# Patient Record
Sex: FEMALE | Race: WHITE | NOT HISPANIC OR LATINO | Employment: PART TIME | URBAN - NONMETROPOLITAN AREA
[De-identification: names, ages, dates, MRNs, and addresses within clinical notes are randomized per-mention and may not be internally consistent; named-entity substitution may affect disease eponyms.]

---

## 2017-02-24 ENCOUNTER — OFFICE VISIT (OUTPATIENT)
Dept: CARDIOLOGY | Facility: CLINIC | Age: 22
End: 2017-02-24

## 2017-02-24 VITALS
WEIGHT: 136 LBS | HEIGHT: 66 IN | DIASTOLIC BLOOD PRESSURE: 65 MMHG | SYSTOLIC BLOOD PRESSURE: 115 MMHG | BODY MASS INDEX: 21.86 KG/M2 | HEART RATE: 87 BPM

## 2017-02-24 DIAGNOSIS — R00.0 TACHYCARDIA: ICD-10-CM

## 2017-02-24 DIAGNOSIS — I42.9 CARDIOMYOPATHY (HCC): Primary | ICD-10-CM

## 2017-02-24 PROCEDURE — 99213 OFFICE O/P EST LOW 20 MIN: CPT | Performed by: INTERNAL MEDICINE

## 2017-02-24 RX ORDER — METOPROLOL SUCCINATE 25 MG/1
25 TABLET, EXTENDED RELEASE ORAL DAILY
Qty: 30 TABLET | Refills: 6 | Status: SHIPPED | OUTPATIENT
Start: 2017-02-24 | End: 2018-04-27

## 2017-02-24 RX ORDER — METOPROLOL SUCCINATE 25 MG/1
25 TABLET, EXTENDED RELEASE ORAL DAILY
COMMUNITY
End: 2017-02-24 | Stop reason: SDUPTHER

## 2017-02-24 RX ORDER — FEXOFENADINE HCL 180 MG/1
180 TABLET ORAL
COMMUNITY

## 2017-02-24 NOTE — PROGRESS NOTES
"Madiha Singletary  22 y.o. female    02/24/2017  1. Cardiomyopathy    2. Tachycardia        History of Present Illness    Ms. Singletary is here for follow-up of above stated problems.  She reports that her palpitations are better with low-dose beta blocker but has had mild weight gain.  Extreme to her that it is highly unlikely that it's related to the medication.  Clinical exam was unremarkable.  She denied any palpitations or dyspnea.  No chest pain was reported.        SUBJECTIVE    Allergies   Allergen Reactions   • Codeine    • Iodinated Diagnostic Agents    • Meropenem    • Oxycodone    • Tegaderm Ag Mesh [Silver]    • Vancomycin    • Zithromax [Azithromycin]          Past Medical History   Diagnosis Date   • Leukemia          Past Surgical History   Procedure Laterality Date   • Lung surgery     • Mediport insertion, single           History reviewed. No pertinent family history.      Social History     Social History   • Marital status: Single     Spouse name: N/A   • Number of children: N/A   • Years of education: N/A     Occupational History   • Not on file.     Social History Main Topics   • Smoking status: Never Smoker   • Smokeless tobacco: Never Used   • Alcohol use Yes      Comment: occasionally   • Drug use: No   • Sexual activity: Defer     Other Topics Concern   • Not on file     Social History Narrative         Current Outpatient Prescriptions   Medication Sig Dispense Refill   • AMITIZA 24 MCG capsule      • fexofenadine (ALLEGRA) 180 MG tablet Take 180 mg by mouth.     • metoprolol succinate XL (TOPROL-XL) 25 MG 24 hr tablet Take 25 mg by mouth Daily.     • montelukast (SINGULAIR) 10 MG tablet Take 10 mg by mouth every night.     • norethindrone-ethinyl estradiol (ORTHO-NOVUM 1/35, 28,) 1-35 MG-MCG per tablet Take 1 tablet by mouth.       No current facility-administered medications for this visit.          OBJECTIVE    Visit Vitals   • /65   • Pulse 87   • Ht 66\" (167.6 cm)   • Wt 136 lb " (61.7 kg)   • BMI 21.95 kg/m2           Review of Systems     Constitutional:  Denies recent weight loss, weight gain, fever or chills, no change in exercise tolerance     HENT:  Denies any hearing loss, epistaxis, hoarseness, or difficulty speaking.     Eyes: Wears eyeglasses or contact lenses     Respiratory:  Denies dyspnea with exertion,no cough, wheezing, or hemoptysis.     Cardiovascular: Negative for palpations, chest pain, orthopnea, PND, peripheral edema, syncope, or claudication.     Gastrointestinal:  Denies change in bowel habits, dyspepsia, ulcer disease, hematochezia, or melena.     Endocrine: Negative for cold intolerance, heat intolerance, polydipsia, polyphagia and polyuria. Denies any history of weight change, heat/cold intolerance, polydipsia, polyuria     Genitourinary: Negative.      Musculoskeletal: Denies any history of arthritic symptoms or back problems     Skin:  Denies any change in hair or nails, rashes, or skin lesions.     Allergic/Immunologic: Negative.  Negative for environmental allergies, food allergies and immunocompromised state.     Neurological:  Denies any history of recurrent headaches, strokes, TIA, or seizure disorder.     Hematological: Denies any food allergies, seasonal allergies, bleeding disorders, or lymphadenopathy.     Psychiatric/Behavioral: Denies any history of depression, substance abuse, or change in cognitive function.         Physical Exam     Constitutional: Cooperative, alert and oriented, well-developed, well-nourished, in no acute distress.     HENT:   Head: Normocephalic, normal hair patterns, no masses or tenderness.  Ears, Nose, and Throat: No gross abnormalities. No pallor or cyanosis. Dentition good.   Eyes: EOMS intact, PERRL, conjunctivae and lids unremarkable. Fundoscopic exam and visual fields not performed.   Neck: No palpable masses or adenopathy, no thyromegaly, no JVD, carotid pulses are full and equal bilaterally and without  Bruits.      Cardiovascular: Regular rhythm, S1 and S2 normal, no S3 or S4. Apical impulse not displaced. No murmurs, gallops, or rubs detected.     Pulmonary/Chest: Chest: normal symmetry, no tenderness to palpation, normal respiratory excursion, no intercostal retraction, no use of accessory muscles.            Pulmonary: Normal breath sounds. No rales or ronchi.    Abdominal: Abdomen soft, bowel sounds normoactive, no masses, no hepatosplenomegaly, non-tender, no bruits.     Musculoskeletal: No deformities, clubbing, cyanosis, erythema, or edema observed. There are no spinal abnormalities noted. Normal muscle strength and tone. Pulses full and equal in all extremities, no bruits auscultated.     Neurological: No gross motor or sensory deficits noted, affect appropriate, oriented to time, person, place.     Skin: Warm and dry to the touch, no apparent skin lesions or masses noted.     Psychiatric: She has a normal mood and affect. Her behavior is normal. Judgment and thought content normal.         Procedures      No results found for: WBC, HGB, HCT, MCV, PLT  No results found for: GLUCOSE, BUN, CREATININE, EGFRIFNONA, EGFRIFAFRI, BCR, CO2, CALCIUM, PROTENTOTREF, ALBUMIN, LABIL2, AST, ALT  No results found for: CHOL  No results found for: TRIG  No results found for: HDL  No results found for: LDLCALC  No results found for: LDL  No results found for: HDLLDLRATIO  No components found for: CHOLHDL  No results found for: HGBA1C  No results found for: TSH, C8ABJAG, T2GBHHA, THYROIDAB        ASSESSMENT AND PLAN  Ms. Singletary is clinically stable with no evidence of angina, arrhythmia or congestive heart failure.  An echocardiogram to reassess left ventricular and valvular function will be arranged.    Diagnoses and all orders for this visit:    Cardiomyopathy    Tachycardia        Nathaniel Mukherjee MD  2/24/2017  2:07 PM

## 2017-03-08 ENCOUNTER — DOCUMENTATION (OUTPATIENT)
Dept: CARDIOLOGY | Facility: CLINIC | Age: 22
End: 2017-03-08

## 2018-03-09 ENCOUNTER — TRANSCRIBE ORDERS (OUTPATIENT)
Dept: CARDIOLOGY | Facility: CLINIC | Age: 23
End: 2018-03-09

## 2018-03-09 DIAGNOSIS — D69.6 THROMBOCYTOPENIA (HCC): ICD-10-CM

## 2018-03-09 DIAGNOSIS — Z85.6 HISTORY OF MYELOID LEUKEMIA: Primary | ICD-10-CM

## 2018-04-27 ENCOUNTER — OFFICE VISIT (OUTPATIENT)
Dept: CARDIOLOGY | Facility: CLINIC | Age: 23
End: 2018-04-27

## 2018-04-27 VITALS
OXYGEN SATURATION: 98 % | HEART RATE: 99 BPM | DIASTOLIC BLOOD PRESSURE: 70 MMHG | HEIGHT: 66 IN | BODY MASS INDEX: 20.92 KG/M2 | WEIGHT: 130.19 LBS | SYSTOLIC BLOOD PRESSURE: 124 MMHG

## 2018-04-27 DIAGNOSIS — R00.0 TACHYCARDIA: ICD-10-CM

## 2018-04-27 DIAGNOSIS — I42.9 CARDIOMYOPATHY, UNSPECIFIED TYPE (HCC): Primary | ICD-10-CM

## 2018-04-27 PROCEDURE — 99214 OFFICE O/P EST MOD 30 MIN: CPT | Performed by: INTERNAL MEDICINE

## 2018-04-27 RX ORDER — METOPROLOL SUCCINATE 50 MG/1
50 TABLET, EXTENDED RELEASE ORAL DAILY
Qty: 30 TABLET | Refills: 11 | Status: SHIPPED | OUTPATIENT
Start: 2018-04-27 | End: 2019-04-26 | Stop reason: SDUPTHER

## 2018-04-28 NOTE — PROGRESS NOTES
Madiha Moore  23 y.o. female    04/27/2018  1. Cardiomyopathy, unspecified type    2. Tachycardia        History of Present Illness    Mrs. 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  He has been managed for elevated heart rate with beta blockers.  However, I note that she has been taking Toprol-XL 25 mg in a.m. though she was advised to take it twice a day.  She is asymptomatic with no chest pain or shortness of breath and is able to perform her activities of daily living without any restrictions.  Echocardiogram performed on 4/19/2018 showed:  · Left ventricular systolic function is normal. Estimated EF = 63%.  · No significant valve abnormalities           SUBJECTIVE    Allergies   Allergen Reactions   • Iodinated Diagnostic Agents Nausea And Vomiting   • Meropenem Other (See Comments)     Unsure    • Codeine Nausea And Vomiting   • Metrizamide Unknown (See Comments)     Unsure     • Oxycodone Nausea And Vomiting   • Tegaderm Ag Mesh [Silver] Rash   • Vancomycin Nausea And Vomiting   • Zithromax [Azithromycin] Nausea And Vomiting         Past Medical History:   Diagnosis Date   • Leukemia          Past Surgical History:   Procedure Laterality Date   • LUNG SURGERY     • MEDIPORT INSERTION, SINGLE           No family history on file.      Social History     Social History   • Marital status: Single     Spouse name: N/A   • Number of children: N/A   • Years of education: N/A     Occupational History   • Not on file.     Social History Main Topics   • Smoking status: Never Smoker   • Smokeless tobacco: Never Used   • Alcohol use Yes      Comment: occasionally   • Drug use: No   • Sexual activity: Defer     Other Topics Concern   • Not on file     Social History Narrative   • No narrative on file         Current Outpatient Prescriptions   Medication Sig Dispense Refill   • AMITIZA 24 MCG capsule      • fexofenadine (ALLEGRA)  "180 MG tablet Take 180 mg by mouth.     • montelukast (SINGULAIR) 10 MG tablet Take 10 mg by mouth every night.     • metoprolol succinate XL (TOPROL-XL) 50 MG 24 hr tablet Take 1 tablet by mouth Daily. 30 tablet 11     No current facility-administered medications for this visit.          OBJECTIVE    /70 (BP Location: Left arm, Patient Position: Sitting, Cuff Size: Adult)   Pulse 99   Ht 167.6 cm (66\")   Wt 59.1 kg (130 lb 3 oz)   LMP 04/22/2018 (Exact Date)   SpO2 98%   Breastfeeding? No   BMI 21.01 kg/m²         Review of Systems     Constitutional:  Denies recent weight loss, weight gain, fever or chills, no change in exercise tolerance     HENT:  Denies any hearing loss, epistaxis, hoarseness, or difficulty speaking.     Eyes: Wears eyeglasses or contact lenses     Respiratory:  Denies dyspnea with exertion,no cough, wheezing, or hemoptysis.     Cardiovascular: Negative for palpations, chest pain, orthopnea, PND, peripheral edema, syncope, or claudication.     Gastrointestinal:  Denies change in bowel habits, dyspepsia, ulcer disease, hematochezia, or melena.     Endocrine: Negative for cold intolerance, heat intolerance, polydipsia, polyphagia and polyuria. Denies any history of weight change, heat/cold intolerance, polydipsia, polyuria     Genitourinary: Negative.      Musculoskeletal: Denies any history of arthritic symptoms or back problems     Skin:  Denies any change in hair or nails, rashes, or skin lesions.     Allergic/Immunologic: Negative.  Negative for environmental allergies, food allergies and immunocompromised state.     Neurological:  Denies any history of recurrent headaches, strokes, TIA, or seizure disorder.     Hematological: Denies any food allergies, seasonal allergies, bleeding disorders, or lymphadenopathy.     Psychiatric/Behavioral: Denies any history of depression, substance abuse, or change in cognitive function.         Physical Exam     Constitutional: Cooperative, " alert and oriented, well-developed, well-nourished, in no acute distress.     HENT:   Head: Normocephalic, normal hair patterns, no masses or tenderness.  Ears, Nose, and Throat: No gross abnormalities. No pallor or cyanosis. Dentition good.   Eyes: EOMS intact, PERRL, conjunctivae and lids unremarkable. Fundoscopic exam and visual fields not performed.   Neck: No palpable masses or adenopathy, no thyromegaly, no JVD, carotid pulses are full and equal bilaterally and without  Bruits.     Cardiovascular: Regular rhythm, S1 and S2 normal, no S3 or S4. Apical impulse not displaced. No murmurs, gallops, or rubs detected.     Pulmonary/Chest: Chest: normal symmetry, no tenderness to palpation, normal respiratory excursion, no intercostal retraction, no use of accessory muscles.            Pulmonary: Normal breath sounds. No rales or ronchi.    Abdominal: Abdomen soft, bowel sounds normoactive, no masses, no hepatosplenomegaly, non-tender, no bruits.     Musculoskeletal: No deformities, clubbing, cyanosis, erythema, or edema observed. There are no spinal abnormalities noted. Normal muscle strength and tone. Pulses full and equal in all extremities, no bruits auscultated.     Neurological: No gross motor or sensory deficits noted, affect appropriate, oriented to time, person, place.     Skin: Warm and dry to the touch, no apparent skin lesions or masses noted.     Psychiatric: She has a normal mood and affect. Her behavior is normal. Judgment and thought content normal.         Procedures      No results found for: WBC, HGB, HCT, MCV, PLT  No results found for: GLUCOSE, BUN, CREATININE, EGFRIFNONA, EGFRIFAFRI, BCR, CO2, CALCIUM, PROTENTOTREF, ALBUMIN, LABIL2, AST, ALT  No results found for: CHOL  No results found for: TRIG  No results found for: HDL  No components found for: LDLCALC  No results found for: LDL  No results found for: HDLLDLRATIO  No components found for: CHOLHDL  No results found for: HGBA1C  No results  found for: TSH, U1VWIGI, [id number], THYROIDAB        ASSESSMENT AND PLAN  Mrs. Moore is stable as regards to her heart and for better compliance with medications I have changed metoprolol succinate to 50 mg in AM.  No signs of arrhythmia or congestive heart failure was noted.  He will be seen in a year and an echocardiogram will be arranged prior to that visit,    Madiha was seen today for med refill and cardiomyopathy.    Diagnoses and all orders for this visit:    Cardiomyopathy, unspecified type  -     Adult Transthoracic Echo Complete W/ Cont if Necessary Per Protocol; Future    Tachycardia  -     Adult Transthoracic Echo Complete W/ Cont if Necessary Per Protocol; Future    Other orders  -     metoprolol succinate XL (TOPROL-XL) 50 MG 24 hr tablet; Take 1 tablet by mouth Daily.        Nathaniel Mukherjee MD  4/28/2018  8:58 AM

## 2018-09-19 ENCOUNTER — TRANSCRIBE ORDERS (OUTPATIENT)
Dept: LAB | Facility: HOSPITAL | Age: 23
End: 2018-09-19

## 2018-09-19 ENCOUNTER — LAB (OUTPATIENT)
Dept: LAB | Facility: HOSPITAL | Age: 23
End: 2018-09-19

## 2018-09-19 DIAGNOSIS — E83.19 IRON OVERLOAD: Primary | ICD-10-CM

## 2018-09-19 DIAGNOSIS — E83.19 IRON OVERLOAD: ICD-10-CM

## 2018-09-19 LAB
Lab: NORMAL
PRE-BLOOD PRESSURE: NORMAL
PRE-HGB: 12.4
PULSE: 96
VOLUME COLLECTED: NORMAL

## 2018-09-19 PROCEDURE — 36415 COLL VENOUS BLD VENIPUNCTURE: CPT

## 2018-09-19 PROCEDURE — 99195 PHLEBOTOMY: CPT

## 2018-10-03 ENCOUNTER — LAB (OUTPATIENT)
Dept: LAB | Facility: HOSPITAL | Age: 23
End: 2018-10-03

## 2018-10-03 ENCOUNTER — TRANSCRIBE ORDERS (OUTPATIENT)
Dept: LAB | Facility: HOSPITAL | Age: 23
End: 2018-10-03

## 2018-10-03 DIAGNOSIS — E83.19 IRON OVERLOAD: Primary | ICD-10-CM

## 2018-10-03 DIAGNOSIS — E83.19 IRON OVERLOAD: ICD-10-CM

## 2018-10-03 LAB
Lab: NORMAL
PRE-BLOOD PRESSURE: NORMAL
PRE-HCT: NORMAL
PRE-HGB: 13.5
PULSE: 88
VOLUME COLLECTED: NORMAL

## 2018-10-03 PROCEDURE — 99195 PHLEBOTOMY: CPT

## 2018-10-03 PROCEDURE — 36415 COLL VENOUS BLD VENIPUNCTURE: CPT

## 2018-10-17 ENCOUNTER — LAB (OUTPATIENT)
Dept: LAB | Facility: HOSPITAL | Age: 23
End: 2018-10-17

## 2018-10-17 DIAGNOSIS — E83.19 IRON OVERLOAD: ICD-10-CM

## 2018-10-17 LAB
BASOPHILS # BLD AUTO: 0.01 10*3/MM3 (ref 0–0.2)
BASOPHILS NFR BLD AUTO: 0.2 % (ref 0–2)
DEPRECATED RDW RBC AUTO: 44.3 FL (ref 36.4–46.3)
EOSINOPHIL # BLD AUTO: 0.2 10*3/MM3 (ref 0–0.7)
EOSINOPHIL NFR BLD AUTO: 3.5 % (ref 0–7)
ERYTHROCYTE [DISTWIDTH] IN BLOOD BY AUTOMATED COUNT: 12.5 % (ref 11.5–14.5)
FERRITIN SERPL-MCNC: 743 NG/ML (ref 6.2–137)
HCT VFR BLD AUTO: 37.2 % (ref 35–45)
HGB BLD-MCNC: 12.7 G/DL (ref 12–15.5)
IMM GRANULOCYTES # BLD: 0.01 10*3/MM3 (ref 0–0.02)
IMM GRANULOCYTES NFR BLD: 0.2 % (ref 0–0.5)
LYMPHOCYTES # BLD AUTO: 2.34 10*3/MM3 (ref 0.6–4.2)
LYMPHOCYTES NFR BLD AUTO: 40.4 % (ref 10–50)
Lab: NORMAL
MCH RBC QN AUTO: 32.9 PG (ref 26.5–34)
MCHC RBC AUTO-ENTMCNC: 34.1 G/DL (ref 31.4–36)
MCV RBC AUTO: 96.4 FL (ref 80–98)
MONOCYTES # BLD AUTO: 0.41 10*3/MM3 (ref 0–0.9)
MONOCYTES NFR BLD AUTO: 7.1 % (ref 0–12)
NEUTROPHILS # BLD AUTO: 2.82 10*3/MM3 (ref 2–8.6)
NEUTROPHILS NFR BLD AUTO: 48.6 % (ref 37–80)
NRBC BLD MANUAL-RTO: 0 /100 WBC (ref 0–0)
PLATELET # BLD AUTO: 79 10*3/MM3 (ref 150–450)
PMV BLD AUTO: 9.5 FL (ref 8–12)
PRE-BLOOD PRESSURE: NORMAL
PRE-HGB: 13.1
PULSE: 88
RBC # BLD AUTO: 3.86 10*6/MM3 (ref 3.77–5.16)
RBC MORPH BLD: NORMAL
SMALL PLATELETS BLD QL SMEAR: NORMAL
VOLUME COLLECTED: 280
WBC MORPH BLD: NORMAL
WBC NRBC COR # BLD: 5.79 10*3/MM3 (ref 3.2–9.8)

## 2018-10-17 PROCEDURE — 85007 BL SMEAR W/DIFF WBC COUNT: CPT

## 2018-10-17 PROCEDURE — 82728 ASSAY OF FERRITIN: CPT

## 2018-10-17 PROCEDURE — 99195 PHLEBOTOMY: CPT

## 2018-10-17 PROCEDURE — 85025 COMPLETE CBC W/AUTO DIFF WBC: CPT

## 2018-10-17 PROCEDURE — 36415 COLL VENOUS BLD VENIPUNCTURE: CPT

## 2018-10-30 ENCOUNTER — LAB (OUTPATIENT)
Dept: LAB | Facility: HOSPITAL | Age: 23
End: 2018-10-30

## 2018-10-30 DIAGNOSIS — E83.19 IRON OVERLOAD: ICD-10-CM

## 2018-10-30 LAB
Lab: NORMAL
PRE-BLOOD PRESSURE: NORMAL
PRE-HGB: 12.6
PULSE: 100
VOLUME COLLECTED: NORMAL

## 2018-10-30 PROCEDURE — 36415 COLL VENOUS BLD VENIPUNCTURE: CPT

## 2018-10-30 PROCEDURE — 99195 PHLEBOTOMY: CPT

## 2018-11-14 ENCOUNTER — LAB (OUTPATIENT)
Dept: LAB | Facility: HOSPITAL | Age: 23
End: 2018-11-14

## 2018-11-14 DIAGNOSIS — E83.19 IRON OVERLOAD: ICD-10-CM

## 2018-11-14 LAB
Lab: NORMAL
PRE-BLOOD PRESSURE: NORMAL
PRE-HGB: 12.9
PULSE: 76
VOLUME COLLECTED: 280

## 2018-11-14 PROCEDURE — 99195 PHLEBOTOMY: CPT

## 2018-12-05 ENCOUNTER — LAB (OUTPATIENT)
Dept: LAB | Facility: HOSPITAL | Age: 23
End: 2018-12-05

## 2018-12-05 DIAGNOSIS — E83.19 IRON OVERLOAD: ICD-10-CM

## 2018-12-05 LAB
Lab: NORMAL
PRE-BLOOD PRESSURE: NORMAL
PRE-HGB: 13.5
PULSE: 80
VOLUME COLLECTED: 280

## 2018-12-05 PROCEDURE — 99195 PHLEBOTOMY: CPT

## 2018-12-19 ENCOUNTER — LAB (OUTPATIENT)
Dept: LAB | Facility: HOSPITAL | Age: 23
End: 2018-12-19

## 2018-12-19 DIAGNOSIS — E83.19 IRON OVERLOAD: ICD-10-CM

## 2018-12-19 LAB
BASOPHILS # BLD AUTO: 0.01 10*3/MM3 (ref 0–0.2)
BASOPHILS NFR BLD AUTO: 0.2 % (ref 0–2)
DEPRECATED RDW RBC AUTO: 45.4 FL (ref 36.4–46.3)
EOSINOPHIL # BLD AUTO: 0.11 10*3/MM3 (ref 0–0.7)
EOSINOPHIL NFR BLD AUTO: 2.1 % (ref 0–7)
ERYTHROCYTE [DISTWIDTH] IN BLOOD BY AUTOMATED COUNT: 12.7 % (ref 11.5–14.5)
FERRITIN SERPL-MCNC: 521 NG/ML (ref 6.2–137)
HCT VFR BLD AUTO: 36.3 % (ref 35–45)
HGB BLD-MCNC: 12.4 G/DL (ref 12–15.5)
IMM GRANULOCYTES # BLD: 0 10*3/MM3 (ref 0–0.02)
IMM GRANULOCYTES NFR BLD: 0 % (ref 0–0.5)
LYMPHOCYTES # BLD AUTO: 1.98 10*3/MM3 (ref 0.6–4.2)
LYMPHOCYTES NFR BLD AUTO: 37.8 % (ref 10–50)
Lab: NORMAL
MCH RBC QN AUTO: 33.2 PG (ref 26.5–34)
MCHC RBC AUTO-ENTMCNC: 34.2 G/DL (ref 31.4–36)
MCV RBC AUTO: 97.3 FL (ref 80–98)
MONOCYTES # BLD AUTO: 0.34 10*3/MM3 (ref 0–0.9)
MONOCYTES NFR BLD AUTO: 6.5 % (ref 0–12)
NEUTROPHILS # BLD AUTO: 2.8 10*3/MM3 (ref 2–8.6)
NEUTROPHILS NFR BLD AUTO: 53.4 % (ref 37–80)
PLATELET # BLD AUTO: 81 10*3/MM3 (ref 150–450)
PMV BLD AUTO: 9.9 FL (ref 8–12)
PRE-BLOOD PRESSURE: NORMAL
PRE-HGB: 12.5
PULSE: 84
RBC # BLD AUTO: 3.73 10*6/MM3 (ref 3.77–5.16)
VOLUME COLLECTED: 280
WBC NRBC COR # BLD: 5.24 10*3/MM3 (ref 3.2–9.8)

## 2018-12-19 PROCEDURE — 85025 COMPLETE CBC W/AUTO DIFF WBC: CPT

## 2018-12-19 PROCEDURE — 82728 ASSAY OF FERRITIN: CPT

## 2018-12-19 PROCEDURE — 36415 COLL VENOUS BLD VENIPUNCTURE: CPT

## 2018-12-19 PROCEDURE — 99195 PHLEBOTOMY: CPT

## 2019-01-18 ENCOUNTER — LAB (OUTPATIENT)
Dept: LAB | Facility: HOSPITAL | Age: 24
End: 2019-01-18

## 2019-01-18 DIAGNOSIS — E83.19 IRON OVERLOAD: ICD-10-CM

## 2019-01-18 LAB
Lab: NORMAL
PRE-BLOOD PRESSURE: NORMAL
PRE-HGB: 12.9
PULSE: 68
VOLUME COLLECTED: NORMAL

## 2019-01-18 PROCEDURE — 99195 PHLEBOTOMY: CPT

## 2019-01-18 PROCEDURE — 36415 COLL VENOUS BLD VENIPUNCTURE: CPT

## 2019-02-13 ENCOUNTER — TRANSCRIBE ORDERS (OUTPATIENT)
Dept: LAB | Facility: HOSPITAL | Age: 24
End: 2019-02-13

## 2019-02-13 ENCOUNTER — LAB (OUTPATIENT)
Dept: LAB | Facility: HOSPITAL | Age: 24
End: 2019-02-13

## 2019-02-13 DIAGNOSIS — E83.19 IRON OVERLOAD: ICD-10-CM

## 2019-02-13 DIAGNOSIS — E83.19 IRON OVERLOAD: Primary | ICD-10-CM

## 2019-02-13 LAB
Lab: NORMAL
PRE-BLOOD PRESSURE: NORMAL
PRE-HGB: 13
PULSE: 98
VOLUME COLLECTED: 280

## 2019-02-13 PROCEDURE — 99195 PHLEBOTOMY: CPT

## 2019-03-01 ENCOUNTER — LAB (OUTPATIENT)
Dept: LAB | Facility: HOSPITAL | Age: 24
End: 2019-03-01

## 2019-03-01 DIAGNOSIS — E83.19 IRON OVERLOAD: ICD-10-CM

## 2019-03-01 LAB
Lab: NORMAL
PRE-BLOOD PRESSURE: NORMAL
PRE-HCT: 42.9
PRE-HGB: 14.3
PULSE: 88
VOLUME COLLECTED: 280

## 2019-03-01 PROCEDURE — 36415 COLL VENOUS BLD VENIPUNCTURE: CPT

## 2019-03-01 PROCEDURE — 99195 PHLEBOTOMY: CPT

## 2019-03-15 ENCOUNTER — LAB (OUTPATIENT)
Dept: LAB | Facility: HOSPITAL | Age: 24
End: 2019-03-15

## 2019-03-15 DIAGNOSIS — E83.19 IRON OVERLOAD: ICD-10-CM

## 2019-03-15 LAB
Lab: NORMAL
PRE-BLOOD PRESSURE: NORMAL
PRE-HCT: 33.3
PRE-HGB: 11.3
PULSE: 72
VOLUME COLLECTED: NORMAL

## 2019-03-15 PROCEDURE — 36415 COLL VENOUS BLD VENIPUNCTURE: CPT

## 2019-03-15 PROCEDURE — 99195 PHLEBOTOMY: CPT

## 2019-04-05 ENCOUNTER — LAB (OUTPATIENT)
Dept: LAB | Facility: HOSPITAL | Age: 24
End: 2019-04-05

## 2019-04-05 DIAGNOSIS — E83.19 IRON OVERLOAD: ICD-10-CM

## 2019-04-05 LAB
Lab: NORMAL
PRE-BLOOD PRESSURE: NORMAL
PRE-HGB: 11.5
PULSE: 88
VOLUME COLLECTED: 280

## 2019-04-05 PROCEDURE — 99195 PHLEBOTOMY: CPT

## 2019-04-23 ENCOUNTER — DOCUMENTATION (OUTPATIENT)
Dept: CARDIOLOGY | Facility: CLINIC | Age: 24
End: 2019-04-23

## 2019-04-25 DIAGNOSIS — I42.0 CARDIOMYOPATHY, DILATED (HCC): Primary | ICD-10-CM

## 2019-04-26 ENCOUNTER — OFFICE VISIT (OUTPATIENT)
Dept: CARDIOLOGY | Facility: CLINIC | Age: 24
End: 2019-04-26

## 2019-04-26 VITALS
WEIGHT: 130 LBS | HEART RATE: 101 BPM | HEIGHT: 66 IN | DIASTOLIC BLOOD PRESSURE: 60 MMHG | BODY MASS INDEX: 20.89 KG/M2 | SYSTOLIC BLOOD PRESSURE: 100 MMHG

## 2019-04-26 DIAGNOSIS — R00.0 TACHYCARDIA: Primary | ICD-10-CM

## 2019-04-26 DIAGNOSIS — I42.9 CARDIOMYOPATHY, UNSPECIFIED TYPE (HCC): ICD-10-CM

## 2019-04-26 PROCEDURE — 93000 ELECTROCARDIOGRAM COMPLETE: CPT | Performed by: INTERNAL MEDICINE

## 2019-04-26 PROCEDURE — 99214 OFFICE O/P EST MOD 30 MIN: CPT | Performed by: INTERNAL MEDICINE

## 2019-04-26 RX ORDER — METOPROLOL SUCCINATE 50 MG/1
50 TABLET, EXTENDED RELEASE ORAL DAILY
Qty: 90 TABLET | Refills: 3 | Status: SHIPPED | OUTPATIENT
Start: 2019-04-26 | End: 2020-04-27

## 2019-04-26 NOTE — PROGRESS NOTES
Madiha Moore  24 y.o. female    04/26/2019  1. Tachycardia    2. Cardiomyopathy, unspecified type (CMS/HCC)        History of Present Illness    Mrs. 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  He has been managed for elevated heart rate with beta blockers.  Echocardiogram performed last week showed:  · Estimated EF = 60%.  · Left ventricular systolic function is normal.  No significant valve abnormalities were noted.     She has been noted to have elevated ferritin levels and has periodic phlebotomy.  Heart rate was noted to be mildly elevated today and EKG today showed sinus rhythm with heart rate of 101 bpm, sinus tachycardia, rightward axis.  No ST-T wave changes suggestive of ischemia.     She denies any chest pain, dizziness or syncope.  She has been compliant with her medications.      SUBJECTIVE    Allergies   Allergen Reactions   • Iodinated Diagnostic Agents Nausea And Vomiting   • Meropenem Other (See Comments)     Unsure    • Codeine Nausea And Vomiting   • Metrizamide Unknown (See Comments)     Unsure     • Oxycodone Nausea And Vomiting   • Tegaderm Ag Mesh [Silver] Rash   • Vancomycin Nausea And Vomiting   • Zithromax [Azithromycin] Nausea And Vomiting         Past Medical History:   Diagnosis Date   • Leukemia (CMS/HCC)          Past Surgical History:   Procedure Laterality Date   • LUNG SURGERY     • MEDIPORT INSERTION, SINGLE           No family history on file.      Social History     Socioeconomic History   • Marital status:      Spouse name: Not on file   • Number of children: Not on file   • Years of education: Not on file   • Highest education level: Not on file   Tobacco Use   • Smoking status: Never Smoker   • Smokeless tobacco: Never Used   Substance and Sexual Activity   • Alcohol use: Yes     Comment: occasionally   • Drug use: No   • Sexual activity: Defer         Current Outpatient  "Medications   Medication Sig Dispense Refill   • AMITIZA 24 MCG capsule      • fexofenadine (ALLEGRA) 180 MG tablet Take 180 mg by mouth.     • metoprolol succinate XL (TOPROL-XL) 50 MG 24 hr tablet Take 1 tablet by mouth Daily. 30 tablet 11   • montelukast (SINGULAIR) 10 MG tablet Take 10 mg by mouth every night.       No current facility-administered medications for this visit.          OBJECTIVE    /60   Pulse 101   Ht 167.6 cm (65.98\")   Wt 59 kg (130 lb)   BMI 20.99 kg/m²         Review of Systems     Constitutional:  Denies recent weight loss, weight gain, fever or chills, no change in exercise tolerance     HENT:  Denies any hearing loss, epistaxis, hoarseness, or difficulty speaking.     Eyes: Wears eyeglasses or contact lenses     Respiratory:  Denies dyspnea with exertion,no cough, wheezing, or hemoptysis.     Cardiovascular: Negative for palpations, chest pain, orthopnea, PND, peripheral edema, syncope, or claudication.     Gastrointestinal:  Denies change in bowel habits, dyspepsia, ulcer disease, hematochezia, or melena.     Endocrine: Negative for cold intolerance, heat intolerance, polydipsia, polyphagia and polyuria. Denies any history of weight change, heat/cold intolerance, polydipsia, polyuria     Genitourinary: Negative.      Musculoskeletal: Denies any history of arthritic symptoms or back problems     Skin:  Denies any change in hair or nails, rashes, or skin lesions.     Allergic/Immunologic: Negative.  Negative for environmental allergies, food allergies and immunocompromised state.     Neurological:  Denies any history of recurrent headaches, strokes, TIA, or seizure disorder.     Hematological: Elevated ferritin levels.  Periodic phlebotomy    Psychiatric/Behavioral: Denies any history of depression, substance abuse, or change in cognitive function.         Physical Exam     Constitutional: Cooperative, alert and oriented, well-developed, well-nourished, in no acute distress. "     HENT:   Head: Normocephalic, normal hair patterns, no masses or tenderness.  Ears, Nose, and Throat: No gross abnormalities. No pallor or cyanosis. Dentition good.   Eyes: EOMS intact, PERRL, conjunctivae and lids unremarkable. Fundoscopic exam and visual fields not performed.   Neck: No palpable masses or adenopathy, no thyromegaly, no JVD, carotid pulses are full and equal bilaterally and without  Bruits.     Cardiovascular: Regular rhythm, S1 and S2 normal, no S3 or S4. Apical impulse not displaced. No murmurs, gallops, or rubs detected.     Pulmonary/Chest: Chest: normal symmetry,  normal respiratory excursion, no intercostal retraction, no use of accessory muscles.            Pulmonary: Normal breath sounds. No rales or ronchi.    Abdominal: Abdomen soft, bowel sounds normoactive, no masses, no hepatosplenomegaly, non-tender, no bruits.     Musculoskeletal: No deformities, clubbing, cyanosis, erythema, or edema observed. There are no spinal abnormalities noted. Normal muscle strength and tone.     Neurological: No gross motor or sensory deficits noted, affect appropriate, oriented to time, person, place.     Skin: Warm and dry to the touch, no apparent skin lesions or masses noted.     Psychiatric: She has a normal mood and affect. Her behavior is normal. Judgment and thought content normal.         Procedures      Lab Results   Component Value Date    WBC 5.24 12/19/2018    HGB 12.4 12/19/2018    HCT 36.3 12/19/2018    MCV 97.3 12/19/2018    PLT 81 (L) 12/19/2018     No results found for: GLUCOSE, BUN, CREATININE, EGFRIFNONA, EGFRIFAFRI, BCR, CO2, CALCIUM, PROTENTOTREF, ALBUMIN, LABIL2, AST, ALT  Lab Results   Component Value Date    CHOL 229 (H) 08/16/2018     Lab Results   Component Value Date    TRIG 102 08/16/2018     Lab Results   Component Value Date    HDL 51 08/16/2018     No components found for: LDLCALC  Lab Results   Component Value Date     (H) 08/16/2018     No results found for:  HDLLDLRATIO  No components found for: CHOLHDL  Lab Results   Component Value Date    HGBA1C 5.2 08/16/2018     Lab Results   Component Value Date    TSH 0.88 08/16/2018           ASSESSMENT AND PLAN  Mrs. Moore is stable with regards to her heart at this time and have encouraged her to drink at least 60 to 70 ounces of fluid per day.  She does have elevated ferritin levels for which she undergoes phlebotomy periodically.  Mild dehydration may be contributing to her increased heart rate.  I have continued Toprol-XL 50 mg daily.  No signs of congestive heart failure was noted.  An echocardiogram will be arranged prior to her next visit in 1 year.    Madiha was seen today for follow-up.    Diagnoses and all orders for this visit:    Tachycardia    Cardiomyopathy, unspecified type (CMS/HCC)        Patient's Body mass index is 20.99 kg/m². BMI is within normal parameters. No follow-up required..      Nathaniel Mukherjee MD  4/26/2019  9:36 AM

## 2019-05-31 ENCOUNTER — LAB (OUTPATIENT)
Dept: LAB | Facility: HOSPITAL | Age: 24
End: 2019-05-31

## 2019-05-31 DIAGNOSIS — E83.19 IRON OVERLOAD: ICD-10-CM

## 2019-05-31 LAB
Lab: NORMAL
PRE-BLOOD PRESSURE: NORMAL
PRE-HGB: 12.9
PULSE: 63
VOLUME COLLECTED: 200

## 2019-05-31 PROCEDURE — 99195 PHLEBOTOMY: CPT

## 2019-06-26 ENCOUNTER — TRANSCRIBE ORDERS (OUTPATIENT)
Dept: LAB | Facility: HOSPITAL | Age: 24
End: 2019-06-26

## 2019-06-26 ENCOUNTER — LAB (OUTPATIENT)
Dept: LAB | Facility: HOSPITAL | Age: 24
End: 2019-06-26

## 2019-06-26 DIAGNOSIS — E83.19 IRON OVERLOAD: ICD-10-CM

## 2019-06-26 DIAGNOSIS — E83.19 IRON OVERLOAD: Primary | ICD-10-CM

## 2019-06-26 LAB
BASOPHILS # BLD AUTO: 0.01 10*3/MM3 (ref 0–0.2)
BASOPHILS NFR BLD AUTO: 0.2 % (ref 0–1.5)
DEPRECATED RDW RBC AUTO: 43.9 FL (ref 37–54)
EOSINOPHIL # BLD AUTO: 0.16 10*3/MM3 (ref 0–0.4)
EOSINOPHIL NFR BLD AUTO: 3.2 % (ref 0.3–6.2)
ERYTHROCYTE [DISTWIDTH] IN BLOOD BY AUTOMATED COUNT: 12.7 % (ref 12.3–15.4)
FERRITIN SERPL-MCNC: 907 NG/ML (ref 13–150)
HCT VFR BLD AUTO: 36 % (ref 34–46.6)
HGB BLD-MCNC: 11.9 G/DL (ref 12–15.9)
IMM GRANULOCYTES # BLD AUTO: 0.01 10*3/MM3 (ref 0–0.05)
IMM GRANULOCYTES NFR BLD AUTO: 0.2 % (ref 0–0.5)
LYMPHOCYTES # BLD AUTO: 1.76 10*3/MM3 (ref 0.7–3.1)
LYMPHOCYTES NFR BLD AUTO: 35.3 % (ref 19.6–45.3)
Lab: NORMAL
MCH RBC QN AUTO: 31.2 PG (ref 26.6–33)
MCHC RBC AUTO-ENTMCNC: 33.1 G/DL (ref 31.5–35.7)
MCV RBC AUTO: 94.5 FL (ref 79–97)
MONOCYTES # BLD AUTO: 0.36 10*3/MM3 (ref 0.1–0.9)
MONOCYTES NFR BLD AUTO: 7.2 % (ref 5–12)
NEUTROPHILS # BLD AUTO: 2.69 10*3/MM3 (ref 1.7–7)
NEUTROPHILS NFR BLD AUTO: 53.9 % (ref 42.7–76)
NRBC BLD AUTO-RTO: 0 /100 WBC (ref 0–0.2)
PLATELET # BLD AUTO: 87 10*3/MM3 (ref 140–450)
PMV BLD AUTO: 9.7 FL (ref 6–12)
PRE-BLOOD PRESSURE: NORMAL
PRE-HGB: 13.4
PULSE: 96
RBC # BLD AUTO: 3.81 10*6/MM3 (ref 3.77–5.28)
RBC MORPH BLD: NORMAL
SMALL PLATELETS BLD QL SMEAR: NORMAL
VOLUME COLLECTED: 300
WBC MORPH BLD: NORMAL
WBC NRBC COR # BLD: 4.99 10*3/MM3 (ref 3.4–10.8)

## 2019-06-26 PROCEDURE — 85007 BL SMEAR W/DIFF WBC COUNT: CPT

## 2019-06-26 PROCEDURE — 82728 ASSAY OF FERRITIN: CPT

## 2019-06-26 PROCEDURE — 99195 PHLEBOTOMY: CPT

## 2019-06-26 PROCEDURE — 85025 COMPLETE CBC W/AUTO DIFF WBC: CPT

## 2019-06-26 PROCEDURE — 36415 COLL VENOUS BLD VENIPUNCTURE: CPT

## 2019-07-31 ENCOUNTER — LAB (OUTPATIENT)
Dept: LAB | Facility: HOSPITAL | Age: 24
End: 2019-07-31

## 2019-07-31 DIAGNOSIS — E83.19 IRON OVERLOAD: ICD-10-CM

## 2019-07-31 LAB
BASOPHILS # BLD AUTO: 0.01 10*3/MM3 (ref 0–0.2)
BASOPHILS NFR BLD AUTO: 0.2 % (ref 0–1.5)
DEPRECATED RDW RBC AUTO: 45.5 FL (ref 37–54)
EOSINOPHIL # BLD AUTO: 0.23 10*3/MM3 (ref 0–0.4)
EOSINOPHIL NFR BLD AUTO: 3.5 % (ref 0.3–6.2)
ERYTHROCYTE [DISTWIDTH] IN BLOOD BY AUTOMATED COUNT: 13 % (ref 12.3–15.4)
HCT VFR BLD AUTO: 36.2 % (ref 34–46.6)
HGB BLD-MCNC: 12.1 G/DL (ref 12–15.9)
IMM GRANULOCYTES # BLD AUTO: 0.01 10*3/MM3 (ref 0–0.05)
IMM GRANULOCYTES NFR BLD AUTO: 0.2 % (ref 0–0.5)
LYMPHOCYTES # BLD AUTO: 2.04 10*3/MM3 (ref 0.7–3.1)
LYMPHOCYTES NFR BLD AUTO: 30.9 % (ref 19.6–45.3)
Lab: NORMAL
MCH RBC QN AUTO: 31.7 PG (ref 26.6–33)
MCHC RBC AUTO-ENTMCNC: 33.4 G/DL (ref 31.5–35.7)
MCV RBC AUTO: 94.8 FL (ref 79–97)
MONOCYTES # BLD AUTO: 0.41 10*3/MM3 (ref 0.1–0.9)
MONOCYTES NFR BLD AUTO: 6.2 % (ref 5–12)
NEUTROPHILS # BLD AUTO: 3.9 10*3/MM3 (ref 1.7–7)
NEUTROPHILS NFR BLD AUTO: 59 % (ref 42.7–76)
NRBC BLD AUTO-RTO: 0 /100 WBC (ref 0–0.2)
PLATELET # BLD AUTO: 87 10*3/MM3 (ref 140–450)
PMV BLD AUTO: 10.2 FL (ref 6–12)
PRE-BLOOD PRESSURE: NORMAL
PRE-HCT: 36.2
PRE-HGB: 12.1
PULSE: 82
RBC # BLD AUTO: 3.82 10*6/MM3 (ref 3.77–5.28)
VOLUME COLLECTED: 300
WBC NRBC COR # BLD: 6.6 10*3/MM3 (ref 3.4–10.8)

## 2019-07-31 PROCEDURE — 85025 COMPLETE CBC W/AUTO DIFF WBC: CPT

## 2019-07-31 PROCEDURE — 99195 PHLEBOTOMY: CPT

## 2019-07-31 PROCEDURE — 36415 COLL VENOUS BLD VENIPUNCTURE: CPT

## 2019-08-28 ENCOUNTER — LAB (OUTPATIENT)
Dept: LAB | Facility: HOSPITAL | Age: 24
End: 2019-08-28

## 2019-08-28 DIAGNOSIS — E83.19 IRON OVERLOAD: ICD-10-CM

## 2019-08-28 LAB
BASOPHILS # BLD AUTO: 0.01 10*3/MM3 (ref 0–0.2)
BASOPHILS NFR BLD AUTO: 0.2 % (ref 0–1.5)
DEPRECATED RDW RBC AUTO: 44.1 FL (ref 37–54)
EOSINOPHIL # BLD AUTO: 0.11 10*3/MM3 (ref 0–0.4)
EOSINOPHIL NFR BLD AUTO: 2.1 % (ref 0.3–6.2)
ERYTHROCYTE [DISTWIDTH] IN BLOOD BY AUTOMATED COUNT: 12.8 % (ref 12.3–15.4)
HCT VFR BLD AUTO: 37.2 % (ref 34–46.6)
HGB BLD-MCNC: 12.5 G/DL (ref 12–15.9)
IMM GRANULOCYTES # BLD AUTO: 0.01 10*3/MM3 (ref 0–0.05)
IMM GRANULOCYTES NFR BLD AUTO: 0.2 % (ref 0–0.5)
LYMPHOCYTES # BLD AUTO: 1.69 10*3/MM3 (ref 0.7–3.1)
LYMPHOCYTES NFR BLD AUTO: 31.7 % (ref 19.6–45.3)
Lab: NORMAL
MCH RBC QN AUTO: 32.1 PG (ref 26.6–33)
MCHC RBC AUTO-ENTMCNC: 33.6 G/DL (ref 31.5–35.7)
MCV RBC AUTO: 95.4 FL (ref 79–97)
MONOCYTES # BLD AUTO: 0.31 10*3/MM3 (ref 0.1–0.9)
MONOCYTES NFR BLD AUTO: 5.8 % (ref 5–12)
NEUTROPHILS # BLD AUTO: 3.2 10*3/MM3 (ref 1.7–7)
NEUTROPHILS NFR BLD AUTO: 60 % (ref 42.7–76)
NRBC BLD AUTO-RTO: 0 /100 WBC (ref 0–0.2)
PLATELET # BLD AUTO: 98 10*3/MM3 (ref 140–450)
PMV BLD AUTO: 9.8 FL (ref 6–12)
PRE-BLOOD PRESSURE: NORMAL
PRE-HCT: 37.2
PRE-HGB: 12.5
PULSE: 84
RBC # BLD AUTO: 3.9 10*6/MM3 (ref 3.77–5.28)
VOLUME COLLECTED: 300
WBC NRBC COR # BLD: 5.33 10*3/MM3 (ref 3.4–10.8)

## 2019-08-28 PROCEDURE — 85025 COMPLETE CBC W/AUTO DIFF WBC: CPT

## 2019-08-28 PROCEDURE — 99195 PHLEBOTOMY: CPT

## 2019-08-28 PROCEDURE — 36415 COLL VENOUS BLD VENIPUNCTURE: CPT

## 2019-09-25 ENCOUNTER — LAB (OUTPATIENT)
Dept: LAB | Facility: HOSPITAL | Age: 24
End: 2019-09-25

## 2019-09-25 DIAGNOSIS — E83.19 IRON OVERLOAD: ICD-10-CM

## 2019-09-25 LAB
BASOPHILS # BLD AUTO: 0.01 10*3/MM3 (ref 0–0.2)
BASOPHILS NFR BLD AUTO: 0.2 % (ref 0–1.5)
DEPRECATED RDW RBC AUTO: 43.1 FL (ref 37–54)
EOSINOPHIL # BLD AUTO: 0.25 10*3/MM3 (ref 0–0.4)
EOSINOPHIL NFR BLD AUTO: 3.9 % (ref 0.3–6.2)
ERYTHROCYTE [DISTWIDTH] IN BLOOD BY AUTOMATED COUNT: 12.4 % (ref 12.3–15.4)
FERRITIN SERPL-MCNC: 722.3 NG/ML (ref 13–150)
HCT VFR BLD AUTO: 37.8 % (ref 34–46.6)
HGB BLD-MCNC: 12.8 G/DL (ref 12–15.9)
IMM GRANULOCYTES # BLD AUTO: 0.01 10*3/MM3 (ref 0–0.05)
IMM GRANULOCYTES NFR BLD AUTO: 0.2 % (ref 0–0.5)
LYMPHOCYTES # BLD AUTO: 2.42 10*3/MM3 (ref 0.7–3.1)
LYMPHOCYTES NFR BLD AUTO: 37.8 % (ref 19.6–45.3)
Lab: NORMAL
MCH RBC QN AUTO: 31.9 PG (ref 26.6–33)
MCHC RBC AUTO-ENTMCNC: 33.9 G/DL (ref 31.5–35.7)
MCV RBC AUTO: 94.3 FL (ref 79–97)
MONOCYTES # BLD AUTO: 0.39 10*3/MM3 (ref 0.1–0.9)
MONOCYTES NFR BLD AUTO: 6.1 % (ref 5–12)
NEUTROPHILS # BLD AUTO: 3.32 10*3/MM3 (ref 1.7–7)
NEUTROPHILS NFR BLD AUTO: 51.8 % (ref 42.7–76)
NRBC BLD AUTO-RTO: 0 /100 WBC (ref 0–0.2)
PLATELET # BLD AUTO: 90 10*3/MM3 (ref 140–450)
PMV BLD AUTO: 9.9 FL (ref 6–12)
PRE-BLOOD PRESSURE: NORMAL
PRE-HCT: 37.8
PRE-HGB: 12.8
PULSE: 80
RBC # BLD AUTO: 4.01 10*6/MM3 (ref 3.77–5.28)
VOLUME COLLECTED: 300
WBC NRBC COR # BLD: 6.4 10*3/MM3 (ref 3.4–10.8)

## 2019-09-25 PROCEDURE — 36415 COLL VENOUS BLD VENIPUNCTURE: CPT

## 2019-09-25 PROCEDURE — 85025 COMPLETE CBC W/AUTO DIFF WBC: CPT

## 2019-09-25 PROCEDURE — 82728 ASSAY OF FERRITIN: CPT

## 2019-09-25 PROCEDURE — 99195 PHLEBOTOMY: CPT

## 2019-10-30 ENCOUNTER — LAB (OUTPATIENT)
Dept: LAB | Facility: HOSPITAL | Age: 24
End: 2019-10-30

## 2019-10-30 DIAGNOSIS — E83.19 IRON OVERLOAD: ICD-10-CM

## 2019-10-30 LAB
BASOPHILS # BLD AUTO: 0.01 10*3/MM3 (ref 0–0.2)
BASOPHILS NFR BLD AUTO: 0.2 % (ref 0–1.5)
DEPRECATED RDW RBC AUTO: 42.5 FL (ref 37–54)
EOSINOPHIL # BLD AUTO: 0.11 10*3/MM3 (ref 0–0.4)
EOSINOPHIL NFR BLD AUTO: 2.3 % (ref 0.3–6.2)
ERYTHROCYTE [DISTWIDTH] IN BLOOD BY AUTOMATED COUNT: 12.2 % (ref 12.3–15.4)
HCT VFR BLD AUTO: 40.7 % (ref 34–46.6)
HGB BLD-MCNC: 13.8 G/DL (ref 12–15.9)
IMM GRANULOCYTES # BLD AUTO: 0.01 10*3/MM3 (ref 0–0.05)
IMM GRANULOCYTES NFR BLD AUTO: 0.2 % (ref 0–0.5)
LYMPHOCYTES # BLD AUTO: 1.81 10*3/MM3 (ref 0.7–3.1)
LYMPHOCYTES NFR BLD AUTO: 38.2 % (ref 19.6–45.3)
Lab: NORMAL
MCH RBC QN AUTO: 32.1 PG (ref 26.6–33)
MCHC RBC AUTO-ENTMCNC: 33.9 G/DL (ref 31.5–35.7)
MCV RBC AUTO: 94.7 FL (ref 79–97)
MONOCYTES # BLD AUTO: 0.25 10*3/MM3 (ref 0.1–0.9)
MONOCYTES NFR BLD AUTO: 5.3 % (ref 5–12)
NEUTROPHILS # BLD AUTO: 2.55 10*3/MM3 (ref 1.7–7)
NEUTROPHILS NFR BLD AUTO: 53.8 % (ref 42.7–76)
NRBC BLD AUTO-RTO: 0 /100 WBC (ref 0–0.2)
PLATELET # BLD AUTO: 92 10*3/MM3 (ref 140–450)
PMV BLD AUTO: 9.9 FL (ref 6–12)
PRE-BLOOD PRESSURE: NORMAL
PRE-HCT: 40.7
PRE-HGB: 13.8
PULSE: 82
RBC # BLD AUTO: 4.3 10*6/MM3 (ref 3.77–5.28)
RBC MORPH BLD: NORMAL
SMALL PLATELETS BLD QL SMEAR: NORMAL
VOLUME COLLECTED: NORMAL
WBC MORPH BLD: NORMAL
WBC NRBC COR # BLD: 4.74 10*3/MM3 (ref 3.4–10.8)

## 2019-10-30 PROCEDURE — 36415 COLL VENOUS BLD VENIPUNCTURE: CPT

## 2019-10-30 PROCEDURE — 99195 PHLEBOTOMY: CPT

## 2019-10-30 PROCEDURE — 85007 BL SMEAR W/DIFF WBC COUNT: CPT

## 2019-10-30 PROCEDURE — 85025 COMPLETE CBC W/AUTO DIFF WBC: CPT

## 2019-12-04 ENCOUNTER — LAB (OUTPATIENT)
Dept: LAB | Facility: HOSPITAL | Age: 24
End: 2019-12-04

## 2019-12-04 ENCOUNTER — TRANSCRIBE ORDERS (OUTPATIENT)
Dept: LAB | Facility: HOSPITAL | Age: 24
End: 2019-12-04

## 2019-12-04 DIAGNOSIS — E83.19 IRON OVERLOAD: ICD-10-CM

## 2019-12-04 DIAGNOSIS — E83.19 IRON OVERLOAD: Primary | ICD-10-CM

## 2019-12-04 LAB
HGB BLD-MCNC: 11.9 G/DL (ref 12–15.9)
Lab: NORMAL
PRE-BLOOD PRESSURE: NORMAL
PRE-HGB: 13.9
PULSE: 80
VOLUME COLLECTED: 300

## 2019-12-04 PROCEDURE — 85018 HEMOGLOBIN: CPT

## 2019-12-04 PROCEDURE — 36415 COLL VENOUS BLD VENIPUNCTURE: CPT

## 2019-12-04 PROCEDURE — 99195 PHLEBOTOMY: CPT

## 2020-01-03 ENCOUNTER — LAB (OUTPATIENT)
Dept: LAB | Facility: HOSPITAL | Age: 25
End: 2020-01-03

## 2020-01-03 DIAGNOSIS — E83.19 IRON OVERLOAD: ICD-10-CM

## 2020-01-03 LAB
ANISOCYTOSIS BLD QL: NORMAL
BASOPHILS # BLD AUTO: 0.02 10*3/MM3 (ref 0–0.2)
BASOPHILS NFR BLD AUTO: 0.4 % (ref 0–1.5)
DACRYOCYTES BLD QL SMEAR: NORMAL
DEPRECATED RDW RBC AUTO: 42.6 FL (ref 37–54)
EOSINOPHIL # BLD AUTO: 0.08 10*3/MM3 (ref 0–0.4)
EOSINOPHIL NFR BLD AUTO: 1.7 % (ref 0.3–6.2)
ERYTHROCYTE [DISTWIDTH] IN BLOOD BY AUTOMATED COUNT: 12.2 % (ref 12.3–15.4)
FERRITIN SERPL-MCNC: 545.6 NG/ML (ref 13–150)
HCT VFR BLD AUTO: 37.4 % (ref 34–46.6)
HGB BLD-MCNC: 12.3 G/DL (ref 12–15.9)
IMM GRANULOCYTES # BLD AUTO: 0.01 10*3/MM3 (ref 0–0.05)
IMM GRANULOCYTES NFR BLD AUTO: 0.2 % (ref 0–0.5)
LYMPHOCYTES # BLD AUTO: 2.08 10*3/MM3 (ref 0.7–3.1)
LYMPHOCYTES NFR BLD AUTO: 44.3 % (ref 19.6–45.3)
Lab: NORMAL
MCH RBC QN AUTO: 31.1 PG (ref 26.6–33)
MCHC RBC AUTO-ENTMCNC: 32.9 G/DL (ref 31.5–35.7)
MCV RBC AUTO: 94.7 FL (ref 79–97)
MONOCYTES # BLD AUTO: 0.25 10*3/MM3 (ref 0.1–0.9)
MONOCYTES NFR BLD AUTO: 5.3 % (ref 5–12)
NEUTROPHILS # BLD AUTO: 2.25 10*3/MM3 (ref 1.7–7)
NEUTROPHILS NFR BLD AUTO: 48.1 % (ref 42.7–76)
NRBC BLD AUTO-RTO: 0 /100 WBC (ref 0–0.2)
PLATELET # BLD AUTO: 94 10*3/MM3 (ref 140–450)
PMV BLD AUTO: 10.3 FL (ref 6–12)
PRE-BLOOD PRESSURE: NORMAL
PRE-HGB: 13.5
PULSE: 84
RBC # BLD AUTO: 3.95 10*6/MM3 (ref 3.77–5.28)
SMALL PLATELETS BLD QL SMEAR: NORMAL
VOLUME COLLECTED: 300
WBC MORPH BLD: NORMAL
WBC NRBC COR # BLD: 4.69 10*3/MM3 (ref 3.4–10.8)

## 2020-01-03 PROCEDURE — 85007 BL SMEAR W/DIFF WBC COUNT: CPT

## 2020-01-03 PROCEDURE — 82728 ASSAY OF FERRITIN: CPT

## 2020-01-03 PROCEDURE — 99195 PHLEBOTOMY: CPT

## 2020-01-03 PROCEDURE — 36415 COLL VENOUS BLD VENIPUNCTURE: CPT

## 2020-01-03 PROCEDURE — 85025 COMPLETE CBC W/AUTO DIFF WBC: CPT

## 2020-02-12 ENCOUNTER — LAB (OUTPATIENT)
Dept: LAB | Facility: HOSPITAL | Age: 25
End: 2020-02-12

## 2020-02-12 DIAGNOSIS — E83.19 IRON OVERLOAD: ICD-10-CM

## 2020-02-12 LAB
BASOPHILS # BLD AUTO: 0.01 10*3/MM3 (ref 0–0.2)
BASOPHILS NFR BLD AUTO: 0.2 % (ref 0–1.5)
DEPRECATED RDW RBC AUTO: 43.9 FL (ref 37–54)
EOSINOPHIL # BLD AUTO: 0.12 10*3/MM3 (ref 0–0.4)
EOSINOPHIL NFR BLD AUTO: 2.5 % (ref 0.3–6.2)
ERYTHROCYTE [DISTWIDTH] IN BLOOD BY AUTOMATED COUNT: 12.8 % (ref 12.3–15.4)
HCT VFR BLD AUTO: 37 % (ref 34–46.6)
HGB BLD-MCNC: 12.5 G/DL (ref 12–15.9)
IMM GRANULOCYTES # BLD AUTO: 0.01 10*3/MM3 (ref 0–0.05)
IMM GRANULOCYTES NFR BLD AUTO: 0.2 % (ref 0–0.5)
LYMPHOCYTES # BLD AUTO: 1.76 10*3/MM3 (ref 0.7–3.1)
LYMPHOCYTES NFR BLD AUTO: 36.6 % (ref 19.6–45.3)
Lab: NORMAL
MCH RBC QN AUTO: 31.5 PG (ref 26.6–33)
MCHC RBC AUTO-ENTMCNC: 33.8 G/DL (ref 31.5–35.7)
MCV RBC AUTO: 93.2 FL (ref 79–97)
MONOCYTES # BLD AUTO: 0.3 10*3/MM3 (ref 0.1–0.9)
MONOCYTES NFR BLD AUTO: 6.2 % (ref 5–12)
NEUTROPHILS # BLD AUTO: 2.61 10*3/MM3 (ref 1.7–7)
NEUTROPHILS NFR BLD AUTO: 54.3 % (ref 42.7–76)
NRBC BLD AUTO-RTO: 0 /100 WBC (ref 0–0.2)
PLATELET # BLD AUTO: 92 10*3/MM3 (ref 140–450)
PMV BLD AUTO: 10.4 FL (ref 6–12)
PRE-BLOOD PRESSURE: NORMAL
PRE-HGB: 12.7
PULSE: 72
RBC # BLD AUTO: 3.97 10*6/MM3 (ref 3.77–5.28)
RBC MORPH BLD: NORMAL
SMALL PLATELETS BLD QL SMEAR: NORMAL
VOLUME COLLECTED: NORMAL
WBC MORPH BLD: NORMAL
WBC NRBC COR # BLD: 4.81 10*3/MM3 (ref 3.4–10.8)

## 2020-02-12 PROCEDURE — 85007 BL SMEAR W/DIFF WBC COUNT: CPT

## 2020-02-12 PROCEDURE — 36415 COLL VENOUS BLD VENIPUNCTURE: CPT

## 2020-02-12 PROCEDURE — 85025 COMPLETE CBC W/AUTO DIFF WBC: CPT

## 2020-02-12 PROCEDURE — 99195 PHLEBOTOMY: CPT

## 2020-03-13 ENCOUNTER — LAB (OUTPATIENT)
Dept: LAB | Facility: HOSPITAL | Age: 25
End: 2020-03-13

## 2020-03-13 ENCOUNTER — TRANSCRIBE ORDERS (OUTPATIENT)
Dept: LAB | Facility: HOSPITAL | Age: 25
End: 2020-03-13

## 2020-03-13 DIAGNOSIS — D56.5 HGB E-BETA THALASSEMIA (HCC): ICD-10-CM

## 2020-03-13 DIAGNOSIS — E83.19 IRON OVERLOAD: ICD-10-CM

## 2020-03-13 DIAGNOSIS — D56.5 HGB E-BETA THALASSEMIA (HCC): Primary | ICD-10-CM

## 2020-03-13 LAB
DEPRECATED RDW RBC AUTO: 44.6 FL (ref 37–54)
EOSINOPHIL # BLD MANUAL: 0.15 10*3/MM3 (ref 0–0.4)
EOSINOPHIL NFR BLD MANUAL: 3 % (ref 0.3–6.2)
ERYTHROCYTE [DISTWIDTH] IN BLOOD BY AUTOMATED COUNT: 12.9 % (ref 12.3–15.4)
HCT VFR BLD AUTO: 37 % (ref 34–46.6)
HGB BLD-MCNC: 12.5 G/DL (ref 12–15.9)
LYMPHOCYTES # BLD MANUAL: 1.32 10*3/MM3 (ref 0.7–3.1)
LYMPHOCYTES NFR BLD MANUAL: 26 % (ref 19.6–45.3)
LYMPHOCYTES NFR BLD MANUAL: 3 % (ref 5–12)
Lab: NORMAL
MCH RBC QN AUTO: 32 PG (ref 26.6–33)
MCHC RBC AUTO-ENTMCNC: 33.8 G/DL (ref 31.5–35.7)
MCV RBC AUTO: 94.6 FL (ref 79–97)
METAMYELOCYTES NFR BLD MANUAL: 2 % (ref 0–0)
MONOCYTES # BLD AUTO: 0.15 10*3/MM3 (ref 0.1–0.9)
NEUTROPHILS # BLD AUTO: 2.79 10*3/MM3 (ref 1.7–7)
NEUTROPHILS NFR BLD MANUAL: 55 % (ref 42.7–76)
PLATELET # BLD AUTO: 96 10*3/MM3 (ref 140–450)
PMV BLD AUTO: 10.2 FL (ref 6–12)
PRE-BLOOD PRESSURE: NORMAL
PRE-HGB: 12.6
PULSE: 68
RBC # BLD AUTO: 3.91 10*6/MM3 (ref 3.77–5.28)
RBC MORPH BLD: NORMAL
SMALL PLATELETS BLD QL SMEAR: ABNORMAL
VARIANT LYMPHS NFR BLD MANUAL: 11 % (ref 0–5)
VOLUME COLLECTED: 300
WBC MORPH BLD: NORMAL
WBC NRBC COR # BLD: 5.08 10*3/MM3 (ref 3.4–10.8)

## 2020-03-13 PROCEDURE — 85027 COMPLETE CBC AUTOMATED: CPT

## 2020-03-13 PROCEDURE — 99195 PHLEBOTOMY: CPT

## 2020-03-13 PROCEDURE — 36415 COLL VENOUS BLD VENIPUNCTURE: CPT

## 2020-03-13 PROCEDURE — 85007 BL SMEAR W/DIFF WBC COUNT: CPT

## 2020-04-27 RX ORDER — METOPROLOL SUCCINATE 50 MG/1
50 TABLET, EXTENDED RELEASE ORAL DAILY
Qty: 90 TABLET | Refills: 3 | Status: SHIPPED | OUTPATIENT
Start: 2020-04-27 | End: 2020-06-24 | Stop reason: SDUPTHER

## 2020-05-27 ENCOUNTER — TRANSCRIBE ORDERS (OUTPATIENT)
Dept: LAB | Facility: HOSPITAL | Age: 25
End: 2020-05-27

## 2020-05-27 ENCOUNTER — LAB (OUTPATIENT)
Dept: LAB | Facility: HOSPITAL | Age: 25
End: 2020-05-27

## 2020-05-27 DIAGNOSIS — E83.19 IRON OVERLOAD: ICD-10-CM

## 2020-05-27 DIAGNOSIS — E83.19 IRON OVERLOAD: Primary | ICD-10-CM

## 2020-05-27 LAB
BASOPHILS # BLD AUTO: 0.01 10*3/MM3 (ref 0–0.2)
BASOPHILS NFR BLD AUTO: 0.2 % (ref 0–1.5)
DEPRECATED RDW RBC AUTO: 43.2 FL (ref 37–54)
EOSINOPHIL # BLD AUTO: 0.17 10*3/MM3 (ref 0–0.4)
EOSINOPHIL NFR BLD AUTO: 3.2 % (ref 0.3–6.2)
ERYTHROCYTE [DISTWIDTH] IN BLOOD BY AUTOMATED COUNT: 12.6 % (ref 12.3–15.4)
FERRITIN SERPL-MCNC: 581.8 NG/ML (ref 13–150)
HCT VFR BLD AUTO: 36.7 % (ref 34–46.6)
HGB BLD-MCNC: 12.2 G/DL (ref 12–15.9)
IMM GRANULOCYTES # BLD AUTO: 0.01 10*3/MM3 (ref 0–0.05)
IMM GRANULOCYTES NFR BLD AUTO: 0.2 % (ref 0–0.5)
LYMPHOCYTES # BLD AUTO: 1.73 10*3/MM3 (ref 0.7–3.1)
LYMPHOCYTES NFR BLD AUTO: 33 % (ref 19.6–45.3)
MCH RBC QN AUTO: 31.4 PG (ref 26.6–33)
MCHC RBC AUTO-ENTMCNC: 33.2 G/DL (ref 31.5–35.7)
MCV RBC AUTO: 94.6 FL (ref 79–97)
MONOCYTES # BLD AUTO: 0.31 10*3/MM3 (ref 0.1–0.9)
MONOCYTES NFR BLD AUTO: 5.9 % (ref 5–12)
NEUTROPHILS # BLD AUTO: 3.01 10*3/MM3 (ref 1.7–7)
NEUTROPHILS NFR BLD AUTO: 57.5 % (ref 42.7–76)
NRBC BLD AUTO-RTO: 0 /100 WBC (ref 0–0.2)
PLATELET # BLD AUTO: 96 10*3/MM3 (ref 140–450)
PMV BLD AUTO: 9.8 FL (ref 6–12)
RBC # BLD AUTO: 3.88 10*6/MM3 (ref 3.77–5.28)
WBC NRBC COR # BLD: 5.24 10*3/MM3 (ref 3.4–10.8)

## 2020-05-27 PROCEDURE — 85025 COMPLETE CBC W/AUTO DIFF WBC: CPT

## 2020-05-27 PROCEDURE — 36415 COLL VENOUS BLD VENIPUNCTURE: CPT

## 2020-05-27 PROCEDURE — 82728 ASSAY OF FERRITIN: CPT

## 2020-06-01 ENCOUNTER — DOCUMENTATION (OUTPATIENT)
Dept: CARDIOLOGY | Facility: CLINIC | Age: 25
End: 2020-06-01

## 2020-06-23 DIAGNOSIS — R00.0 TACHYCARDIA: Primary | ICD-10-CM

## 2020-06-23 DIAGNOSIS — I42.9 CARDIOMYOPATHY, UNSPECIFIED TYPE (HCC): ICD-10-CM

## 2020-06-24 ENCOUNTER — OFFICE VISIT (OUTPATIENT)
Dept: CARDIOLOGY | Facility: CLINIC | Age: 25
End: 2020-06-24

## 2020-06-24 VITALS
DIASTOLIC BLOOD PRESSURE: 72 MMHG | HEIGHT: 66 IN | OXYGEN SATURATION: 99 % | WEIGHT: 135 LBS | HEART RATE: 83 BPM | BODY MASS INDEX: 21.69 KG/M2 | SYSTOLIC BLOOD PRESSURE: 120 MMHG

## 2020-06-24 DIAGNOSIS — I42.9 CARDIOMYOPATHY, UNSPECIFIED TYPE (HCC): Primary | ICD-10-CM

## 2020-06-24 DIAGNOSIS — R00.0 TACHYCARDIA: ICD-10-CM

## 2020-06-24 PROCEDURE — 93000 ELECTROCARDIOGRAM COMPLETE: CPT | Performed by: INTERNAL MEDICINE

## 2020-06-24 PROCEDURE — 99213 OFFICE O/P EST LOW 20 MIN: CPT | Performed by: INTERNAL MEDICINE

## 2020-06-24 RX ORDER — METOPROLOL SUCCINATE 50 MG/1
50 TABLET, EXTENDED RELEASE ORAL DAILY
Qty: 90 TABLET | Refills: 3 | Status: SHIPPED | OUTPATIENT
Start: 2020-06-24 | End: 2021-08-02

## 2020-06-24 NOTE — PROGRESS NOTES
Madiha Moore  25 y.o. female    1. Cardiomyopathy, unspecified type (CMS/HCC)    2. Tachycardia        History of Present Illness:  Mrs. 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  He has been managed for elevated heart rate with beta blockers.    Echocardiogram performed in May 2020 showed:  · Estimated EF = 58%.  · Left ventricular systolic function is normal.  · No significant valve abnormalities  ·   She has been noted to have elevated ferritin levels and has periodic phlebotomy.  She has chronic thrombocytopenia.    EKG today showed sinus rhythm with heart rate of 78 bpm, rightward axis, no ST-T wave changes or arrhythmia noted.    She denies any chest pain, dizziness or syncope.  She has been compliant with her medications.      SUBJECTIVE    Allergies   Allergen Reactions   • Iodinated Diagnostic Agents Nausea And Vomiting   • Meropenem Other (See Comments)     Unsure    • Codeine Nausea And Vomiting   • Metrizamide Unknown (See Comments)     Unsure     • Oxycodone Nausea And Vomiting   • Tegaderm Ag Mesh [Silver] Rash   • Vancomycin Nausea And Vomiting   • Zithromax [Azithromycin] Nausea And Vomiting         Past Medical History:   Diagnosis Date   • Leukemia (CMS/HCC)          Past Surgical History:   Procedure Laterality Date   • LUNG SURGERY     • MEDIPORT INSERTION, SINGLE           History reviewed. No pertinent family history.      Social History     Socioeconomic History   • Marital status:      Spouse name: Not on file   • Number of children: Not on file   • Years of education: Not on file   • Highest education level: Not on file   Tobacco Use   • Smoking status: Never Smoker   • Smokeless tobacco: Never Used   Substance and Sexual Activity   • Alcohol use: Yes     Comment: occasionally   • Drug use: No   • Sexual activity: Defer         Current Outpatient Medications   Medication Sig Dispense Refill  "  • AMITIZA 24 MCG capsule      • fexofenadine (ALLEGRA) 180 MG tablet Take 180 mg by mouth.     • metoprolol succinate XL (TOPROL-XL) 50 MG 24 hr tablet Take 1 tablet by mouth Daily. 90 tablet 3   • montelukast (SINGULAIR) 10 MG tablet Take 10 mg by mouth every night.       No current facility-administered medications for this visit.          OBJECTIVE    /72 (BP Location: Left arm, Patient Position: Sitting, Cuff Size: Adult)   Pulse 83   Ht 167.6 cm (66\")   Wt 61.2 kg (135 lb)   SpO2 99%   BMI 21.79 kg/m²         Review of Systems     Constitutional:  Denies recent weight loss, weight gain, fever or chills, no change in exercise tolerance     HENT:  Denies any hearing loss, epistaxis, hoarseness, or difficulty speaking.     Eyes: Wears eyeglasses or contact lenses     Respiratory:  Denies dyspnea with exertion,no cough, wheezing, or hemoptysis.     Cardiovascular: Negative for palpations, chest pain, orthopnea, PND, peripheral edema, syncope, or claudication.     Gastrointestinal:  Denies change in bowel habits, dyspepsia, ulcer disease, hematochezia, or melena.     Endocrine: Negative for cold intolerance, heat intolerance, polydipsia, polyphagia and polyuria. Denies any history of weight change, heat/cold intolerance, polydipsia, polyuria     Genitourinary: Negative.      Musculoskeletal: Denies any history of arthritic symptoms or back problems     Skin:  Denies any change in hair or nails, rashes, or skin lesions.     Allergic/Immunologic: Negative.  Negative for environmental allergies, food allergies and immunocompromised state.     Neurological:  Denies any history of recurrent headaches, strokes, TIA, or seizure disorder.     Hematological: Elevated ferritin levels.  Periodic phlebotomy    Psychiatric/Behavioral: Denies any history of depression, substance abuse, or change in cognitive function.         Physical Exam     Constitutional: Cooperative, alert and oriented, well-developed, " well-nourished, in no acute distress.     HENT:   Head: Normocephalic, normal hair patterns, no masses or tenderness.  Ears, Nose, and Throat: No gross abnormalities. No pallor or cyanosis. Dentition good.   Eyes: EOMS intact, PERRL, conjunctivae and lids unremarkable. Fundoscopic exam and visual fields not performed.   Neck: No palpable masses or adenopathy, no thyromegaly, no JVD, carotid pulses are full and equal bilaterally and without  Bruits.     Cardiovascular: Regular rhythm, S1 and S2 normal, no S3 or S4. Apical impulse not displaced. No murmurs, gallops, or rubs detected.     Pulmonary/Chest: Chest: normal symmetry,  normal respiratory excursion, no intercostal retraction, no use of accessory muscles.            Pulmonary: Normal breath sounds. No rales or ronchi.    Abdominal: Abdomen soft, bowel sounds normoactive, no masses, no hepatosplenomegaly, non-tender, no bruits.     Musculoskeletal: No deformities, clubbing, cyanosis, erythema, or edema observed. There are no spinal abnormalities noted. Normal muscle strength and tone.     Neurological: No gross motor or sensory deficits noted, affect appropriate, oriented to time, person, place.     Skin: Warm and dry to the touch, no apparent skin lesions or masses noted.     Psychiatric: She has a normal mood and affect. Her behavior is normal. Judgment and thought content normal.         Procedures      Lab Results   Component Value Date    WBC 5.24 05/27/2020    HGB 12.2 05/27/2020    HCT 36.7 05/27/2020    MCV 94.6 05/27/2020    PLT 96 (L) 05/27/2020     No results found for: GLUCOSE, BUN, CREATININE, EGFRIFNONA, EGFRIFAFRI, BCR, CO2, CALCIUM, PROTENTOTREF, ALBUMIN, LABIL2, BILIRUBIN, AST, ALT  Lab Results   Component Value Date    CHOL 229 (H) 08/16/2018     Lab Results   Component Value Date    TRIG 102 08/16/2018     Lab Results   Component Value Date    HDL 51 08/16/2018     No components found for: LDLCALC  Lab Results   Component Value Date    LDL  143 (H) 08/16/2018     No results found for: HDLLDLRATIO  No components found for: CHOLHDL  Lab Results   Component Value Date    HGBA1C 5.2 08/16/2018     Lab Results   Component Value Date    TSH 0.88 08/16/2018           ASSESSMENT AND PLAN  Mrs. Moore is stable with regards to her heart at this time and have encouraged her to drink at least 60 to 70 ounces of fluid per day.  She does have elevated ferritin levels for which she undergoes phlebotomy periodically. I have continued Toprol-XL 50 mg daily.  No signs of congestive heart failure was noted.  An echocardiogram will be arranged prior to her next visit in 1 year.    Madiha was seen today for follow-up.    Diagnoses and all orders for this visit:    Cardiomyopathy, unspecified type (CMS/HCC)  -     Adult Transthoracic Echo Complete W/ Cont if Necessary Per Protocol; Future    Tachycardia  -     Adult Transthoracic Echo Complete W/ Cont if Necessary Per Protocol; Future        Patient's Body mass index is 21.79 kg/m². BMI is within normal parameters. No follow-up required..  Patient is a non-smoker    Nathaniel Mukherjee MD  6/24/2020  09:15

## 2021-06-23 ENCOUNTER — OFFICE VISIT (OUTPATIENT)
Dept: CARDIOLOGY | Facility: CLINIC | Age: 26
End: 2021-06-23

## 2021-06-23 VITALS
DIASTOLIC BLOOD PRESSURE: 70 MMHG | BODY MASS INDEX: 21.38 KG/M2 | WEIGHT: 133 LBS | TEMPERATURE: 97.2 F | SYSTOLIC BLOOD PRESSURE: 122 MMHG | HEART RATE: 65 BPM | OXYGEN SATURATION: 100 % | HEIGHT: 66 IN

## 2021-06-23 DIAGNOSIS — I42.9 CARDIOMYOPATHY, UNSPECIFIED TYPE (HCC): Primary | ICD-10-CM

## 2021-06-23 DIAGNOSIS — E78.2 MIXED HYPERLIPIDEMIA: ICD-10-CM

## 2021-06-23 PROBLEM — E78.00 PURE HYPERCHOLESTEROLEMIA: Status: ACTIVE | Noted: 2021-06-23

## 2021-06-23 PROCEDURE — 93000 ELECTROCARDIOGRAM COMPLETE: CPT | Performed by: INTERNAL MEDICINE

## 2021-06-23 PROCEDURE — 99213 OFFICE O/P EST LOW 20 MIN: CPT | Performed by: INTERNAL MEDICINE

## 2021-06-23 RX ORDER — SENNA PLUS 8.6 MG/1
1 TABLET ORAL DAILY
COMMUNITY

## 2021-06-23 RX ORDER — NORETHINDRONE AND ETHINYL ESTRADIOL 1 MG-35MCG
KIT ORAL
COMMUNITY
Start: 2021-06-22 | End: 2022-07-01

## 2021-06-23 NOTE — PROGRESS NOTES
Madiha Moore  26 y.o. female    1. Cardiomyopathy, unspecified type (CMS/HCC)    2. Mixed hyperlipidemia        History of Present Illness:  Mrs. 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    She has progressed well since I last saw her and denied any chest pain, shortness of breath or palpitation.  She is followed up with oncology on a regular basis.  They do not require her to have serial echocardiograms anymore.    Echocardiogram performed in May 2020 showed:  · Estimated EF = 58%.  · Left ventricular systolic function is normal.  · No significant valve abnormalities  ·   She has been noted to have elevated ferritin levels and has had periodic phlebotomy.  She has h/o chronic thrombocytopenia and I understand that her platelet count has optimized.    EKG today showed sinus rhythm with heart rate of 65 bpm.  Rightward axis.  QTc interval 409 ms.  Unchanged from previous EKGs.      Allergies   Allergen Reactions   • Iodinated Diagnostic Agents Nausea And Vomiting   • Meropenem Other (See Comments)     Unsure    • Codeine Nausea And Vomiting   • Metrizamide Unknown (See Comments)     Unsure     • Oxycodone Nausea And Vomiting   • Tegaderm Ag Mesh [Silver] Rash   • Vancomycin Nausea And Vomiting   • Zithromax [Azithromycin] Nausea And Vomiting         Past Medical History:   Diagnosis Date   • Leukemia (CMS/HCC)          Past Surgical History:   Procedure Laterality Date   • LUNG SURGERY     • MEDIPORT INSERTION, SINGLE           History reviewed. No pertinent family history.      Social History     Socioeconomic History   • Marital status:      Spouse name: Not on file   • Number of children: Not on file   • Years of education: Not on file   • Highest education level: Not on file   Tobacco Use   • Smoking status: Never Smoker   • Smokeless tobacco: Never Used   Substance and Sexual Activity   • Alcohol use: Yes      "Comment: occasionally   • Drug use: No   • Sexual activity: Defer         Current Outpatient Medications   Medication Sig Dispense Refill   • AMITIZA 24 MCG capsule      • metoprolol succinate XL (TOPROL-XL) 50 MG 24 hr tablet Take 1 tablet by mouth Daily. 90 tablet 3   • Pirmella 1/35 1-35 MG-MCG per tablet      • senna (SENOKOT) 8.6 MG tablet Take 1 tablet by mouth Daily.     • fexofenadine (ALLEGRA) 180 MG tablet Take 180 mg by mouth.     • montelukast (SINGULAIR) 10 MG tablet Take 10 mg by mouth every night.       No current facility-administered medications for this visit.         OBJECTIVE    /70 (BP Location: Left arm, Patient Position: Sitting, Cuff Size: Adult)   Pulse 65   Temp 97.2 °F (36.2 °C)   Ht 167.6 cm (66\")   Wt 60.3 kg (133 lb)   SpO2 100%   BMI 21.47 kg/m²         Review of Systems : The following systems are reviewed and no changes noted    Constitutional:  Denies recent weight loss, weight gain, fever or chills, no change in exercise tolerance     HENT:  Denies any hearing loss, epistaxis, hoarseness, or difficulty speaking.     Eyes: Wears eyeglasses or contact lenses     Respiratory:  Denies dyspnea with exertion,no cough, wheezing, or hemoptysis.     Cardiovascular: Negative for palpations, chest pain, orthopnea, PND, peripheral edema, syncope, or claudication.     Gastrointestinal:  Denies change in bowel habits, dyspepsia, ulcer disease, hematochezia, or melena.     Endocrine: Negative for cold intolerance, heat intolerance, polydipsia, polyphagia and polyuria. Denies any history of weight change, heat/cold intolerance, polydipsia, polyuria     Genitourinary: Negative.      Musculoskeletal: Denies any history of arthritic symptoms or back problems     Skin:  Denies any change in hair or nails, rashes, or skin lesions.     Allergic/Immunologic: Negative.  Negative for environmental allergies, food allergies and immunocompromised state.     Neurological:  Denies any history of " recurrent headaches, strokes, TIA, or seizure disorder.     Hematological: Elevated ferritin levels.  Periodic phlebotomy    Psychiatric/Behavioral: Denies any history of depression, substance abuse, or change in cognitive function.         Physical Exam : The following systems were reviewed and no changes noted    Constitutional: Cooperative, alert and oriented, well-developed, well-nourished, in no acute distress.     HENT:   Head: Normocephalic, normal hair patterns, no masses or tenderness.  Ears, Nose, and Throat: No gross abnormalities. No pallor or cyanosis. Dentition good.   Eyes: EOMS intact, PERRL, conjunctivae and lids unremarkable. Fundoscopic exam and visual fields not performed.   Neck: No palpable masses or adenopathy, no thyromegaly, no JVD, carotid pulses are full and equal bilaterally and without  Bruits.     Cardiovascular: Regular rhythm, S1 and S2 normal, no S3 or S4. No murmurs, gallops, or rubs detected.     Pulmonary/Chest: Chest: normal symmetry,  normal respiratory excursion, no intercostal retraction, no use of accessory muscles.            Pulmonary: Normal breath sounds. No rales or ronchi.    Abdominal: Abdomen soft, bowel sounds normoactive, no masses, no hepatosplenomegaly, non-tender, no bruits.     Musculoskeletal: No deformities, clubbing, cyanosis, erythema, or edema observed.    Neurological: No gross motor or sensory deficits noted, affect appropriate, oriented to time, person, place.     Skin: Warm and dry to the touch, no apparent skin lesions or masses noted.     Psychiatric: She has a normal mood and affect. Her behavior is normal. Judgment and thought content normal.         Procedures      Lab Results   Component Value Date    WBC 5.24 05/27/2020    HGB 12.2 05/27/2020    HCT 36.7 05/27/2020    MCV 94.6 05/27/2020    PLT 96 (L) 05/27/2020     No results found for: GLUCOSE, BUN, CREATININE, EGFRIFNONA, EGFRIFAFRI, BCR, CO2, CALCIUM, PROTENTOTREF, ALBUMIN, LABIL2, BILIRUBIN,  AST, ALT  Lab Results   Component Value Date    CHOL 237 (H) 10/06/2020    CHOL 229 (H) 08/16/2018     Lab Results   Component Value Date    TRIG 165 (H) 10/06/2020    TRIG 102 08/16/2018     Lab Results   Component Value Date    HDL 43 10/06/2020    HDL 51 08/16/2018     No components found for: LDLCALC  Lab Results   Component Value Date     (H) 10/06/2020     (H) 08/16/2018     No results found for: HDLLDLRATIO  No components found for: CHOLHDL  Lab Results   Component Value Date    HGBA1C 5.6 10/06/2020     Lab Results   Component Value Date    TSH 0.88 08/16/2018           ASSESSMENT AND PLAN  Mrs. Moore is stable with regards to her heart at this time with no clinical evidence of any arrhythmia or congestive heart failure.  Lab results in October 2020 showed an elevated total cholesterol of 237 with LDL of 169 and triglyceride of 167.  I have advised her to watch her diet closely and cut back on fatty foods.  Red yeast rice has been recommended.   Metoprolol succinate 50 mg daily has been continued.    Diagnoses and all orders for this visit:    1. Cardiomyopathy, unspecified type (CMS/HCC) (Primary)  -     ECG 12 Lead    2. Mixed hyperlipidemia        Patient's Body mass index is 21.47 kg/m². BMI is within normal parameters. No follow-up required..  Patient is a non-smoker    Addendum: I understand that Tyler oMore, is being considered for sublingual immunotherapy and beta-blockers are considered a contraindication for this treatment.  I did discuss this issue with the patient and she wishes to consider allergy immunotherapy.  Her LV ejection fraction was normal by echocardiogram when checked in May 2020.  We could stop metoprolol succinate 50 mg daily at this time and continue to follow her LV function.  Please feel free to call me if there are any questions.      Nathaniel Mukherjee MD  6/23/2021  20:56 CDT

## 2021-06-24 LAB
QT INTERVAL: 394 MS
QTC INTERVAL: 409 MS

## 2021-08-02 RX ORDER — METOPROLOL SUCCINATE 50 MG/1
TABLET, EXTENDED RELEASE ORAL
Qty: 90 TABLET | Refills: 3 | Status: SHIPPED | OUTPATIENT
Start: 2021-08-02 | End: 2022-08-12

## 2022-07-01 ENCOUNTER — OFFICE VISIT (OUTPATIENT)
Dept: CARDIOLOGY | Facility: CLINIC | Age: 27
End: 2022-07-01

## 2022-07-01 VITALS
TEMPERATURE: 97.8 F | WEIGHT: 140 LBS | DIASTOLIC BLOOD PRESSURE: 68 MMHG | OXYGEN SATURATION: 98 % | SYSTOLIC BLOOD PRESSURE: 110 MMHG | BODY MASS INDEX: 22.5 KG/M2 | HEIGHT: 66 IN | HEART RATE: 76 BPM

## 2022-07-01 DIAGNOSIS — R00.0 TACHYCARDIA: ICD-10-CM

## 2022-07-01 DIAGNOSIS — I42.9 CARDIOMYOPATHY, UNSPECIFIED TYPE: Primary | ICD-10-CM

## 2022-07-01 DIAGNOSIS — E78.2 MIXED HYPERLIPIDEMIA: ICD-10-CM

## 2022-07-01 PROCEDURE — 99214 OFFICE O/P EST MOD 30 MIN: CPT | Performed by: INTERNAL MEDICINE

## 2022-07-01 PROCEDURE — 93000 ELECTROCARDIOGRAM COMPLETE: CPT | Performed by: INTERNAL MEDICINE

## 2022-07-01 NOTE — PROGRESS NOTES
Madiha Moore  27 y.o. female    1. Cardiomyopathy, unspecified type (HCC)    2. Mixed hyperlipidemia    3. Tachycardia        History of Present Illness:  Mrs. 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      She has progressed quite well and denied any chest pain, shortness of breath, palpitation or dizziness. She has been compliant with her medication.  Her heart rate has been in the normal range.  The patient was apparently going to go on immunotherapy for multiple allergies but was advised to go off beta-blockers for this.  However the patient decided against this and has continued to take beta-blockers.    Echocardiogram performed in May 2020 showed:  · Estimated EF = 58%.  · Left ventricular systolic function is normal.  · No significant valve abnormalities  ·   She has been noted to have elevated ferritin levels and has had periodic phlebotomy.  She has h/o chronic thrombocytopenia and this is being monitored by hematology/oncology.    EKG today showed sinus rhythm with heart rate of 76 bpm.  Rightward axis.  Mild IVCD of the RBBB type.  Unchanged from previous EKGs.      Allergies   Allergen Reactions   • Iodinated Diagnostic Agents Nausea And Vomiting   • Meropenem Other (See Comments)     Unsure    • Codeine Nausea And Vomiting   • Metrizamide Unknown (See Comments)     Unsure     • Oxycodone Nausea And Vomiting   • Tegaderm Ag Mesh [Silver] Rash   • Vancomycin Nausea And Vomiting   • Zithromax [Azithromycin] Nausea And Vomiting         Past Medical History:   Diagnosis Date   • Leukemia (HCC)          Past Surgical History:   Procedure Laterality Date   • LUNG SURGERY     • MEDIPORT INSERTION, SINGLE           History reviewed. No pertinent family history.      Social History     Socioeconomic History   • Marital status:    Tobacco Use   • Smoking status: Never Smoker   • Smokeless tobacco: Never Used  "  Substance and Sexual Activity   • Alcohol use: Yes     Comment: occasionally   • Drug use: No   • Sexual activity: Defer         Current Outpatient Medications   Medication Sig Dispense Refill   • AMITIZA 24 MCG capsule      • fexofenadine (ALLEGRA) 180 MG tablet Take 180 mg by mouth.     • metoprolol succinate XL (TOPROL-XL) 50 MG 24 hr tablet TAKE ONE TABLET BY MOUTH DAILY 90 tablet 3   • senna (SENOKOT) 8.6 MG tablet Take 1 tablet by mouth Daily.       No current facility-administered medications for this visit.         OBJECTIVE    /68 (BP Location: Left arm, Patient Position: Sitting, Cuff Size: Adult)   Pulse 76   Temp 97.8 °F (36.6 °C)   Ht 167.6 cm (66\")   Wt 63.5 kg (140 lb)   SpO2 98%   BMI 22.60 kg/m²         Review of Systems : The following systems are reviewed and no changes noted    Constitutional:  Denies recent weight loss, weight gain, fever or chills, no change in exercise tolerance     HENT:  Denies any hearing loss, epistaxis, hoarseness, or difficulty speaking.     Eyes: Wears eyeglasses or contact lenses     Respiratory:  Denies dyspnea with exertion,no cough, wheezing, or hemoptysis.     Cardiovascular: Negative for palpations, chest pain, orthopnea, PND, peripheral edema, syncope, or claudication.     Gastrointestinal:  Denies change in bowel habits, dyspepsia, ulcer disease, hematochezia, or melena.     Endocrine: Negative for cold intolerance, heat intolerance, polydipsia, polyphagia and polyuria.    Genitourinary: Negative.      Musculoskeletal: Denies any history of arthritic symptoms or back problems     Skin:  Denies any change in hair or nails, rashes, or skin lesions.     Allergic/Immunologic: Negative.  Negative for environmental allergies, food allergies and immunocompromised state.     Neurological:  Denies any history of recurrent headaches, strokes, TIA, or seizure disorder.     Hematological: Elevated ferritin levels.  Periodic " phlebotomy    Psychiatric/Behavioral: Denies any history of depression, substance abuse, or change in cognitive function.         Physical Exam : The following systems were reviewed and no changes noted    Constitutional: Cooperative, alert and oriented, well-developed, well-nourished, in no acute distress.     HENT:   Head: Normocephalic, normal hair patterns, no masses or tenderness.  Ears, Nose, and Throat: No gross abnormalities. No pallor or cyanosis. Dentition good.   Eyes: EOMS intact, PERRL, conjunctivae and lids unremarkable. Fundoscopic exam and visual fields not performed.   Neck: No palpable masses or adenopathy, no thyromegaly, no JVD, carotid pulses are full and equal bilaterally and without  Bruits.     Cardiovascular: Regular rhythm, S1 and S2 normal, no S3 or S4. No murmurs, gallops, or rubs detected.     Pulmonary/Chest: Chest: normal symmetry,  normal respiratory excursion, no intercostal retraction, no use of accessory muscles.            Pulmonary: Normal breath sounds. No rales or ronchi.    Abdominal: Abdomen soft, bowel sounds normoactive, no masses, no hepatosplenomegaly, non-tender, no bruits.     Musculoskeletal: No deformities, clubbing, cyanosis, erythema, or edema observed.    Neurological: No gross motor or sensory deficits noted, affect appropriate, oriented to time, person, place.     Skin: Warm and dry to the touch, no apparent skin lesions or masses noted.     Psychiatric: She has a normal mood and affect. Her behavior is normal. Judgment and thought content normal.         Procedures      Lab Results   Component Value Date    WBC 4.71 11/01/2021    HGB 12.7 11/01/2021    HCT 38.5 11/01/2021    MCV 96.0 11/01/2021     (L) 11/01/2021     Lab Results   Component Value Date    BUN 21 (H) 08/16/2018    CREATININE 0.9 08/16/2018    CALCIUM 8.7 (L) 08/16/2018    ALBUMIN 4.2 08/16/2018    AST 14 (L) 08/16/2018    ALT 25 (L) 08/16/2018     Lab Results   Component Value Date    CHOL  237 (H) 10/06/2020    CHOL 229 (H) 08/16/2018     Lab Results   Component Value Date    TRIG 165 (H) 10/06/2020    TRIG 102 08/16/2018     Lab Results   Component Value Date    HDL 43 10/06/2020    HDL 51 08/16/2018     No components found for: LDLCALC  Lab Results   Component Value Date     (H) 10/06/2020     (H) 08/16/2018     No results found for: HDLLDLRATIO  No components found for: CHOLHDL  Lab Results   Component Value Date    HGBA1C 5.0 11/01/2021     Lab Results   Component Value Date    TSH 0.88 08/16/2018           ASSESSMENT AND PLAN  Mrs. Moore is stable with regards to her heart at this time with no clinical evidence of any arrhythmia or congestive heart failure.  He has been advised on appropriate diet and optimization of LDL.    An echocardiogram to reassess left ventricular and valvular function has been arranged.  Metoprolol succinate 50 mg daily has been continued.    Diagnoses and all orders for this visit:    1. Cardiomyopathy, unspecified type (HCC) (Primary)  -     ECG 12 Lead  -     Adult Transthoracic Echo Complete w/ Color, Spectral and Contrast if Necessary Per Protocol; Future    2. Mixed hyperlipidemia  -     Adult Transthoracic Echo Complete w/ Color, Spectral and Contrast if Necessary Per Protocol; Future    3. Tachycardia  -     Adult Transthoracic Echo Complete w/ Color, Spectral and Contrast if Necessary Per Protocol; Future        Patient's Body mass index is 22.6 kg/m². BMI is within normal parameters. No follow-up required..  Patient is a non-smoker      Nathaniel Mukherjee MD  7/1/2022  16:41 CDT

## 2022-07-06 LAB
QT INTERVAL: 404 MS
QTC INTERVAL: 454 MS

## 2022-08-12 RX ORDER — METOPROLOL SUCCINATE 50 MG/1
TABLET, EXTENDED RELEASE ORAL
Qty: 90 TABLET | Refills: 3 | Status: SHIPPED | OUTPATIENT
Start: 2022-08-12

## 2022-08-16 ENCOUNTER — HOSPITAL ENCOUNTER (OUTPATIENT)
Age: 27
Setting detail: SPECIMEN
Discharge: HOME | End: 2022-08-16
Payer: COMMERCIAL

## 2022-08-16 PROCEDURE — 88305 TISSUE EXAM BY PATHOLOGIST: CPT

## 2022-08-16 PROCEDURE — 88342 IMHCHEM/IMCYTCHM 1ST ANTB: CPT

## 2022-09-22 ENCOUNTER — TELEPHONE (OUTPATIENT)
Dept: CARDIOLOGY | Facility: CLINIC | Age: 27
End: 2022-09-22

## 2022-09-22 NOTE — TELEPHONE ENCOUNTER
Returned patient call per Dr. salcido her echo was within acceptable normal limits. She voiced her understanding.   ----- Message from Yolanda Palacios sent at 9/22/2022  9:36 AM CDT -----  Regarding: callback  Contact: [phone]  Madiha Moore would like a call back with her echo results from 8/17. Return number is [phone]. Thanks

## 2023-08-02 ENCOUNTER — LAB (OUTPATIENT)
Dept: LAB | Facility: HOSPITAL | Age: 28
End: 2023-08-02
Payer: COMMERCIAL

## 2023-08-02 DIAGNOSIS — E78.2 MIXED HYPERLIPIDEMIA: ICD-10-CM

## 2023-08-02 LAB
CHOLEST SERPL-MCNC: 183 MG/DL (ref 0–200)
HDLC SERPL-MCNC: 43 MG/DL (ref 40–60)
LDLC SERPL CALC-MCNC: 126 MG/DL (ref 0–100)
LDLC/HDLC SERPL: 2.92 {RATIO}
TRIGL SERPL-MCNC: 73 MG/DL (ref 0–150)
VLDLC SERPL-MCNC: 14 MG/DL (ref 5–40)

## 2023-08-02 PROCEDURE — 36415 COLL VENOUS BLD VENIPUNCTURE: CPT

## 2023-08-02 PROCEDURE — 80061 LIPID PANEL: CPT

## 2023-08-29 RX ORDER — METOPROLOL SUCCINATE 50 MG/1
TABLET, EXTENDED RELEASE ORAL
Qty: 90 TABLET | Refills: 3 | Status: SHIPPED | OUTPATIENT
Start: 2023-08-29